# Patient Record
Sex: FEMALE | Race: BLACK OR AFRICAN AMERICAN
[De-identification: names, ages, dates, MRNs, and addresses within clinical notes are randomized per-mention and may not be internally consistent; named-entity substitution may affect disease eponyms.]

---

## 2017-03-23 NOTE — EMERGENCY ROOM REPORT
History of Present Illness


General


Chief Complaint:  Nausea


Source:  Patient





Present Illness


HPI


26-year-old female presents ED.  States for one week she's been feeling dizzy 

and lightheaded with nausea and vomiting.  Denies any headache.  Denies any 

fevers or chills.  Denies any dysuria or hematuria.  Patient notes history of 

vertigo but denies any room spinning sensation at this time.  No aggravating 

relieving factors.  Denies any other associated symptoms


Allergies:  


Coded Allergies:  


     No Known Allergies (Unverified , 12/28/16)





Patient History


Past Medical History:  asthma


Past Surgical History:  none


Pertinent Family History:  none


Social History:  Denies: alcohol use, drug use, smoking


Pregnant Now:  No


Immunizations:  UTD


Reviewed Nursing Documentation:  PMH: Agreed, PSxH: Agreed





Nursing Documentation-PMH


Hx Asthma:  Yes





Review of Systems


All Other Systems:  negative except mentioned in HPI





Physical Exam





Vital Signs








  Date Time  Temp Pulse Resp B/P Pulse Ox O2 Delivery O2 Flow Rate FiO2


 


3/23/17 12:08 97.7 65 20 112/72 98 Room Air  








Sp02 EP Interpretation:  reviewed, normal


General Appearance:  no apparent distress, alert, GCS 15, non-toxic


Head:  normocephalic, atraumatic


Eyes:  bilateral eye PERRL, bilateral eye normal inspection


ENT:  hearing grossly normal, normal pharynx, no angioedema, normal voice


Neck:  full range of motion, supple/symm/no masses


Respiratory:  chest non-tender, lungs clear, normal breath sounds, speaking 

full sentences


Cardiovascular #1:  regular rate, rhythm, no edema


Cardiovascular #2:  2+ carotid (R), 2+ carotid (L), 2+ radial (R), 2+ radial (L)

, 2+ dorsalis pedis (R), 2+ dorsalis pedis (L)


Gastrointestinal:  normal bowel sounds, non tender, soft, non-distended, no 

guarding, no rebound


Rectal:  deferred


Genitourinary:  normal inspection, no CVA tenderness


Musculoskeletal:  back normal, gait/station normal, normal range of motion, non-

tender


Neurologic:  alert, oriented x3, responsive, motor strength/tone normal, 

sensory intact, speech normal


Psychiatric:  judgement/insight normal, memory normal, mood/affect normal, no 

suicidal/homicidal ideation


Reflexes:  3+ bicep (R), 3+ bicep (L), 3+ tricep (R), 3+ tricep (L), 3+ knee (R)

, 3+ knee (L)


Skin:  normal color, no rash, warm/dry, well hydrated


Lymphatic:  no adenopathy





Medical Decision Making


Diagnostic Impression:  


 Primary Impression:  


 Dizziness


ER Course


Hospital Course 


26-year-old female presents ED complaining of dizziness and nausea times one 

week





Differential diagnoses include: tension headache, migraine, dehydration, UTI





Clinical course


Patient placed on stretcher.  After initial history and physical I ordered labs

, IV fluids, Reglan, tylenol





Before workup could be completed, patient pulled out IV and left.  Stating that 

she needed to take her daughter home was with her at bedside.





Labs-no leukocytosis, hemoglobin/hematocrit stable, electrolytes okay, UA 

negative





Diagnosis- dizziness





patient eloped from ER





Labs








Test


  3/23/17


11:10 3/23/17


11:15


 


Urine Color Pale yellow  


 


Urine Appearance Clear  


 


Urine pH 7 (4.5-8.0)  


 


Urine Specific Gravity


  1.010


(1.005-1.035) 


 


 


Urine Protein


  Negative


(NEGATIVE) 


 


 


Urine Glucose (UA)


  Negative


(NEGATIVE) 


 


 


Urine Ketones


  Negative


(NEGATIVE) 


 


 


Urine Occult Blood 4+ (NEGATIVE)  


 


Urine Nitrite


  Negative


(NEGATIVE) 


 


 


Urine Bilirubin


  Negative


(NEGATIVE) 


 


 


Urine Urobilinogen


  Normal MG/DL


(0.0-1.0) 


 


 


Urine Leukocyte Esterase 1+ (NEGATIVE)  


 


Urine RBC


  5-10 /HPF (0 -


2) 


 


 


Urine WBC


  2-4 /HPF (0 -


2) 


 


 


Urine Squamous Epithelial


Cells Few /LPF


(NONE/OCC) 


 


 


Urine Bacteria


  Few /HPF


(NONE) 


 


 


Urine HCG, Qualitative Negative  


 


White Blood Count


  


  6.1 K/UL


(4.8-10.8)


 


Red Blood Count


  


  5.01 M/UL


(4.20-5.40)


 


Hemoglobin


  


  14.6 G/DL


(12.0-16.0)


 


Hematocrit


  


  43.8 %


(37.0-47.0)


 


Mean Corpuscular Volume  88 FL (80-99) 


 


Mean Corpuscular Hemoglobin


  


  29.2 PG


(27.0-31.0)


 


Mean Corpuscular Hemoglobin


Concent 


  33.3 G/DL


(32.0-36.0)


 


Red Cell Distribution Width


  


  11.8 %


(11.6-14.8)


 


Platelet Count


  


  196 K/UL


(150-450)


 


Mean Platelet Volume


  


  12.3 FL


(6.5-10.1)


 


Neutrophils (%) (Auto)


  


  55.0 %


(45.0-75.0)


 


Lymphocytes (%) (Auto)


  


  33.9 %


(20.0-45.0)


 


Monocytes (%) (Auto)


  


  9.1 %


(1.0-10.0)


 


Eosinophils (%) (Auto)


  


  0.9 %


(0.0-3.0)


 


Basophils (%) (Auto)


  


  1.1 %


(0.0-2.0)


 


Sodium Level


  


  139 mEQ/L


(135-145)


 


Potassium Level


  


  3.8 mEQ/L


(3.4-4.9)


 


Chloride Level


  


  98 mEQ/L


()


 


Carbon Dioxide Level


  


  27 mEQ/L


(20-30)


 


Anion Gap  14 (5-15) 


 


Blood Urea Nitrogen


  


  11 mg/dL


(7-23)


 


Creatinine


  


  0.9 mg/dL


(0.5-0.9)


 


Estimat Glomerular Filtration


Rate 


  > 60 mL/min


(>60)


 


Glucose Level


  


  93 mg/dL


()


 


Calcium Level


  


  9.8 mg/dL


(8.6-10.2)


 


Total Bilirubin


  


  0.2 mg/dL


(0.0-1.2)


 


Aspartate Amino Transf


(AST/SGOT) 


  18 U/L (5-40) 


 


 


Alanine Aminotransferase


(ALT/SGPT) 


  15 U/L (3-33) 


 


 


Alkaline Phosphatase


  


  85 U/L


()


 


Total Protein


  


  7.5 g/dL


(6.6-8.7)


 


Albumin


  


  4.1 g/dL


(3.5-5.2)


 


Globulin  3.4 g/dL 


 


Albumin/Globulin Ratio  1.2 (1.0-2.7) 











Last Vital Signs








  Date Time  Temp Pulse Resp B/P Pulse Ox O2 Delivery O2 Flow Rate FiO2


 


3/23/17 12:57 97.7  20 112/72 98 Room Air  


 


3/23/17 12:08  65      








Status:  unchanged


Disposition:  ELOPED


Condition:  Unknown


Referrals:  


[facility] (PCP)











STEPHEN ROMAN M.D. Mar 23, 2017 13:52

## 2017-04-03 NOTE — EMERGENCY ROOM REPORT
History of Present Illness


General


Chief Complaint:  Female Urogenital Problems


Source:  Patient





Present Illness


[facility]


The patient is a 26-year-old female presenting for possible urinary tract 

infection.  The patient denies any vaginal pain or dysuria but does admit to 

increased urinary frequency.  The patient states that she was supposed to start 

her period 2 days prior but has not.  The patient denies any other symptoms 

including nausea, vomiting, abdominal pain, flank pain, fever, chills, vaginal 

DC


Allergies:  


Coded Allergies:  


     No Known Allergies (Unverified , 12/28/16)





Patient History


Past Medical History:  see triage record


Pertinent Family History:  none


Reviewed Nursing Documentation:  PMH: Agreed, PSxH: Agreed





Nursing Documentation-PMH


Past Medical History:  No Stated History


Hx Asthma:  Yes





Review of Systems


All Other Systems:  negative except mentioned in HPI





Physical Exam





Vital Signs








  Date Time  Temp Pulse Resp B/P Pulse Ox O2 Delivery O2 Flow Rate FiO2


 


4/3/17 18:29 98.1 80 16 110/58 99 Room Air  








Sp02 EP Interpretation:  reviewed, normal


General Appearance:  no apparent distress, alert, GCS 15, non-toxic


Head:  normocephalic, atraumatic


Eyes:  bilateral eye PERRL, bilateral eye normal inspection


ENT:  hearing grossly normal, normal pharynx, no angioedema, normal voice


Neck:  full range of motion, supple/symm/no masses


Gastrointestinal:  normal bowel sounds, non tender, soft, non-distended, no 

guarding, no rebound


Genitourinary:  normal inspection, no CVA tenderness


Musculoskeletal:  back normal, gait/station normal, normal range of motion, non-

tender


Neurologic:  alert, oriented x3, responsive, motor strength/tone normal, 

sensory intact, speech normal


Psychiatric:  judgement/insight normal, memory normal, mood/affect normal, no 

suicidal/homicidal ideation


Skin:  normal color, no rash, warm/dry, well hydrated


Lymphatic:  no adenopathy





Medical Decision Making


PA Attestation


Dr. Cotton is my supervising physician. Patient management was discussed with 

my supervising physician


Diagnostic Impression:  


 Primary Impression:  


 Pregnancy


ER Course


The patient is a 26-year-old female presenting for possible urinary tract 

infection.





Differential diagnosis considered but not limited to: UTI, vaginitis, 

pyelonephritis, PID, pregnancy 





PE: Vitals WNL.


NAD. 


Abdomen: Normal appearance. Non distended. No ecchymosis. 


Normal BS. Non TTP. No McBurney point tenderness. No guarding. 


No CVA tenderness





Urinalysis is unremarkable.


Positive pregnancy





The patient is informed of this result and needs to followup with OB/GYN.


Patient is given a prescription for prenatal vitamins.  ER precautions given





Laboratory Tests








Test


  4/3/17


18:40


 


Urine Color Pale yellow  


 


Urine Appearance Clear  


 


Urine pH 6 (4.5-8.0)  


 


Urine Specific Gravity


  1.020


(1.005-1.035)


 


Urine Protein


  Negative


(NEGATIVE)


 


Urine Glucose (UA)


  Negative


(NEGATIVE)


 


Urine Ketones


  Negative


(NEGATIVE)


 


Urine Occult Blood


  3+ (NEGATIVE)


H


 


Urine Nitrite


  Negative


(NEGATIVE)


 


Urine Bilirubin


  Negative


(NEGATIVE)


 


Urine Urobilinogen


  Normal MG/DL


(0.0-1.0)


 


Urine Leukocyte Esterase


  1+ (NEGATIVE)


H


 


Urine RBC


  2-4 /HPF (0 -


2)  H


 


Urine WBC


  0-2 /HPF (0 -


2)


 


Urine Squamous Epithelial


Cells Few /LPF


(NONE/OCC)


 


Urine Bacteria


  Few /HPF


(NONE)


 


Urine HCG, Qualitative Positive  








Lab Results Impression


Urinalysis is unremarkable.


Positive pregnancy





Last Vital Signs








  Date Time  Temp Pulse Resp B/P Pulse Ox O2 Delivery O2 Flow Rate FiO2


 


4/3/17 19:43 98.1 81 16 110/58 99 Room Air  








Status:  improved


Disposition:  HOME, SELF-CARE


Condition:  Improved


Scripts


Pnv Cmb#21/Iron/Folic Acid (PRENATAL COMPLETE CAPLET) 1 Each Tablet


1 EACH PO DAILY, #30 TAB


   Prov: LIVE JACQUES         4/3/17


Referrals:  


[facility] (PCP)


Patient Instructions:  First Trimester of Pregnancy





Additional Instructions:  


I discussed my findings with the patient. All questions and concerns have been 

answered. Treatment and medication compliance have been addressed. I advised 

the patient that they need to follow up with PMD in 3-5 days. Return to ED if 

symptoms worsen, new symptoms arise, or if needed for any reason. Patient 

verbalized understanding of discharge instructions.





The patient is advised she needs to followup with OB/GYN











LIVE JACQUES Apr 3, 2017 22:58

## 2017-04-06 NOTE — EMERGENCY ROOM REPORT
History of Present Illness


General


Chief Complaint:  Abdominal Pain


Source:  Patient





Present Illness


HPI


26-year-old female presents to the emergency department complaining of 

abdominal pain with continued spotting since .  Patient states that she 

was told she was pregnant by urine pregnancy test here in the emergency 

department last week.  Patient has not been able to followup with OB/GYN.  

Patient states over the course of today she has had progressive abdominal pain 

with nausea.   Patient reports right sided abdominal tenderness. denies fall or 

trauma.  Patient reports lower abdominal cramping.  Patient denies fevers or 

chills. Denies CP, Palpitations, LOC, AMS, dizziness, Changes in Vision, 

Sensation, paresthesias, or a sudden severe headache.


Allergies:  


Coded Allergies:  


     No Known Allergies (Unverified , 17)





Patient History


Past Medical History:  see triage record


Past Surgical History:  none


Pertinent Family History:  none


Last Menstrual Period:  n/a


Pregnant Now:  Yes


:  3


Para:  1


Reviewed Nursing Documentation:  PMH: Agreed, PSxH: Agreed





Nursing Documentation-PMH


Past Medical History:  No History, Except For


Hx Asthma:  Yes





Review of Systems


All Other Systems:  negative except mentioned in HPI





Physical Exam





Vital Signs








  Date Time  Temp Pulse Resp B/P Pulse Ox O2 Delivery O2 Flow Rate FiO2


 


17 15:23 98.1 64 16 119/82 99 Room Air  








Sp02 EP Interpretation:  reviewed, normal


General Appearance:  no apparent distress, alert, GCS 15, non-toxic


Head:  normocephalic, atraumatic


Eyes:  bilateral eye PERRL, bilateral eye normal inspection


ENT:  hearing grossly normal, normal pharynx, no angioedema, normal voice


Neck:  full range of motion, supple/symm/no masses


Respiratory:  chest non-tender, lungs clear, normal breath sounds, speaking 

full sentences


Cardiovascular #1:  regular rate, rhythm, no edema


Gastrointestinal:  normal bowel sounds, non tender, soft, no guarding, no 

rebound, other - PT Tenderness is Right adenexal no appreciable abdominal TTP


Rectal:  deferred


Genitourinary:  normal inspection, no CVA tenderness, other - right adenexal TTP


Musculoskeletal:  back normal, gait/station normal, normal range of motion, non-

tender


Neurologic:  alert, oriented x3, responsive, motor strength/tone normal, 

sensory intact, speech normal


Psychiatric:  judgement/insight normal, memory normal, mood/affect normal, no 

suicidal/homicidal ideation


Skin:  normal color, no rash, warm/dry, well hydrated


Lymphatic:  no adenopathy





Medical Decision Making


PA Attestation


Dr. guevara is my supervising Physician whom patient management has been 

discussed with.


Diagnostic Impression:  


 Primary Impression:  


 Ectopic pregnancy


 Qualified Codes:  O00.20 - Ovarian pregnancy without intrauterine pregnancy


 Additional Impression:  


 UTI


ER Course


26-year-old female presents to the emergency department complaining of 

abdominal pain with continued spotting since .  Patient states that she 

was told she was pregnant by urine pregnancy test here in the emergency 

department last week.  Patient has not been able to followup with OB/GYN.  

Patient states over the course of today she has had progressive abdominal pain 

with nausea.  Patient reports right sided abdominal tenderness. denies fall or 

trauma.  Patient reports lower abdominal cramping.  Patient denies fevers or 

chills.





Ddx considered but are not limited to: Fibroid, ectopic pregnancy, Fibroid, 

Spontaneous . 


Vital signs: are WNL, pt. is afebrile 





H&PE are most consistent with: possible ectopic pregnancy 


ORDERS: 


-Urine hcg- Positive 


-serum Hcg Quant:  2,069 


- Blood/RH type and screen- see attached labs : (B Negative)


-Pelvic US complete- Right Adnexal extrauterine structure with yolk sack --

indicating ectopic pregnancy of the right ovary, no IUP per official radiology 

report. 


 


ED INTERVENTIONS: 


-RhoGam 1500 IU IM once.





**  OBGYN CONSULT: Dr. Mesa who recommended IM Methotrexate and follow up with 

OBGYN for repeat hcg in 3-5 days. 





- Methotrexate IM 100mg





DISCHARGE: At this time pt. is stable for d/c to home. Will provide printed 

patient care instructions, and any necessary prescriptions. Care plan and 

follow up instructions have been discussed with the patient prior to discharge.





Labs








Test


  17


15:28 17


15:55 17


16:41 17


18:40


 


Urine HCG, Qualitative Positive    


 


Urine Color  Pale yellow   


 


Urine Appearance  Clear   


 


Urine pH  6.5 (4.5-8.0)   


 


Urine Specific Gravity


  


  1.015


(1.005-1.035) 


  


 


 


Urine Protein


  


  Negative


(NEGATIVE) 


  


 


 


Urine Glucose (UA)


  


  Negative


(NEGATIVE) 


  


 


 


Urine Ketones


  


  Negative


(NEGATIVE) 


  


 


 


Urine Occult Blood  4+ (NEGATIVE)   


 


Urine Nitrite


  


  Negative


(NEGATIVE) 


  


 


 


Urine Bilirubin


  


  Negative


(NEGATIVE) 


  


 


 


Urine Urobilinogen


  


  Normal MG/DL


(0.0-1.0) 


  


 


 


Urine Leukocyte Esterase  3+ (NEGATIVE)   


 


Urine RBC


  


  10-15 /HPF (0


- 2) 


  


 


 


Urine WBC


  


  5-10 /HPF (0 -


2) 


  


 


 


Urine Squamous Epithelial


Cells 


  Moderate /LPF


(NONE/OCC) 


  


 


 


Urine Amorphous Sediment


  


  Few /LPF


(NONE) 


  


 


 


Urine Bacteria


  


  Moderate /HPF


(NONE) 


  


 


 


Sodium Level


  


  138 mEQ/L


(135-145) 


  


 


 


Potassium Level


  


  3.7 mEQ/L


(3.4-4.9) 


  


 


 


Chloride Level


  


  98 mEQ/L


() 


  


 


 


Carbon Dioxide Level


  


  23 mEQ/L


(20-30) 


  


 


 


Anion Gap  17 (5-15)   


 


Blood Urea Nitrogen  6 mg/dL (7-23)   


 


Creatinine


  


  0.8 mg/dL


(0.5-0.9) 


  


 


 


Estimat Glomerular Filtration


Rate 


  > 60 mL/min


(>60) 


  


 


 


Glucose Level


  


  110 mg/dL


() 


  


 


 


Calcium Level


  


  9.2 mg/dL


(8.6-10.2) 


  


 


 


Human Chorionic Gonadotropin,


Quant 


  2063 mIU/mL 


  


  


 


 


White Blood Count


  


  


  5.1 K/UL


(4.8-10.8) 


 


 


Red Blood Count


  


  


  4.51 M/UL


(4.20-5.40) 


 


 


Hemoglobin


  


  


  14.0 G/DL


(12.0-16.0) 


 


 


Hematocrit


  


  


  39.5 %


(37.0-47.0) 


 


 


Mean Corpuscular Volume   88 FL (80-99)  


 


Mean Corpuscular Hemoglobin


  


  


  31.0 PG


(27.0-31.0) 


 


 


Mean Corpuscular Hemoglobin


Concent 


  


  35.4 G/DL


(32.0-36.0) 


 


 


Red Cell Distribution Width


  


  


  11.7 %


(11.6-14.8) 


 


 


Platelet Count


  


  


  175 K/UL


(150-450) 


 


 


Mean Platelet Volume


  


  


  11.1 FL


(6.5-10.1) 


 


 


Neutrophils (%) (Auto)


  


  


  54.7 %


(45.0-75.0) 


 


 


Lymphocytes (%) (Auto)


  


  


  37.8 %


(20.0-45.0) 


 


 


Monocytes (%) (Auto)


  


  


  6.4 %


(1.0-10.0) 


 


 


Eosinophils (%) (Auto)


  


  


  0.4 %


(0.0-3.0) 


 


 


Basophils (%) (Auto)


  


  


  0.8 %


(0.0-2.0) 


 


 


Prothrombin Time


  


  


  


  10.4 SEC


(9.30-11.50)


 


Prothromb Time International


Ratio 


  


  


  1.0 (0.9-1.1) 


 


 


Activated Partial


Thromboplast Time 


  


  


  28 SEC (23-33) 


 











Last Vital Signs








  Date Time  Temp Pulse Resp B/P Pulse Ox O2 Delivery O2 Flow Rate FiO2


 


17 15:23 98.1 64 16 119/82 99 Room Air  








Disposition:  HOME, SELF-CARE


Condition:  Stable


Scripts


Nitrofurantoin Monohyd/M-Cryst* (MACROBID 100 MG*) 100 Mg Capsule


100 MG ORAL EVERY 12 HOURS for 5 Days, #10 CAP


   Prov: Krystal Hair         17 


Hydrocodone Bit/Acetaminophen 5-325* (NORCO 5-325 TABLET*) 1 Each Tablet


1 TAB ORAL Q6HR Y for For Pain, #7 TAB


   Prov: Krystal Hair         17 


Ibuprofen* (MOTRIN*) 600 Mg Tablet


600 MG ORAL THREE TIMES A DAY, #30 TAB 0 Refills


   Prov: Krystal Hair         17


Patient Instructions:  Ectopic Pregnancy, Easy-to-Read





Additional Instructions:  


Take medications as directed. 





Follow up with OBGYN in 3-5 days for repeat HCG, your  Hcg quant today was 2,

069. 


 


Return sooner to ED if new symptoms occur, or current symptoms become worse. 


Do not drink alcohol, drive, or operate heavy machinery while taking Norco as 

this may cause drowsiness. 











- Please note that this Emergency Department Report was dictated using [employer] technology software, occasionally this can lead to 

erroneous entry secondary to interpretation by the dictation equipment.











rKystal Hair 2017 16:14

## 2017-04-07 NOTE — DIAGNOSTIC IMAGING REPORT
Indication: Pelvic pain, positive pregnancy test



Technique: Transabdominal and transvaginal images.



Comparison: 9/29/2014



Findings: Uterus is retroverted, measures 10 cm length by 6.2 cm AP. Endometrium 

is

thickened, measuring 24 mm in thickness. No intrauterine gestational sac is

demonstrated. No myometrial abnormality.



Adjacent to the right ovary is a cyst like structure with an echogenic rim and

possible central yolk sac. The right ovary itself measures 4.2 cm in length. The

left ovary measures 3.6 cm in length. There is trace free pelvic fluid, possibly

with some debris



Impression: Ectopic pregnancy. No intrauterine pregnancy demonstrated. Cystic

structure in right adnexal region with possible surrounding decidual reaction and

yolk sac Findings are suspicious for right adnexal ectopic pregnancy



Small amount free pelvic fluid, possibly physiologic or possibly some hemorrhage

related to the above



Markedly thickened endometrium



Critical value findings discussed by phone by Dr. Block with Dr. Renee at 1831 

on

4/6/2017

## 2017-04-09 NOTE — EMERGENCY ROOM REPORT
History of Present Illness


General


Chief Complaint:  Pregnancy Complications


Source:  Patient





Present Illness


HPI


26-year-old female presents to ED for evaluation.  Patient was here 2 days ago 

for ectopic pregnancy.  Patient was given methotrexate injection and 

subsequently discharged.  Patient was told to return to OB/GYN in 3-4 days to 

have repeat beta-hCG levels drawn.  Patient is here today because she is also 

having some cramping pain.  Noted spotting.  Pain is 5/10.  Nonradiating.  No 

other aggravating or relieving factors.  Patient states the pain is less than 

her previous visit.  Patient states that she was prescribed Norco and states it 

is too strong.  Patient would like a different prescription for pain.  No other 

aggravating or relieving factors.  Denies any other associated symptoms


Allergies:  


Coded Allergies:  


     No Known Allergies (Unverified , 17)





Patient History


Past Medical History:  asthma


Past Surgical History:  none


Pertinent Family History:  none


Social History:  Denies: alcohol use, drug use, smoking


Last Menstrual Period:  17


Pregnant Now:  No


:  3


Para:  1


Immunizations:  UTD


Reviewed Nursing Documentation:  PMH: Agreed, PSxH: Agreed





Nursing Documentation-PMH


Past Medical History:  No Stated History


Hx Asthma:  Yes





Review of Systems


All Other Systems:  negative except mentioned in HPI





Physical Exam





Vital Signs








  Date Time  Temp Pulse Resp B/P Pulse Ox O2 Delivery O2 Flow Rate FiO2


 


17 13:12 97.9 67 16 112/68 100 Room Air  








Sp02 EP Interpretation:  reviewed, normal


General Appearance:  no apparent distress, alert, GCS 15, non-toxic


Head:  normocephalic, atraumatic


Eyes:  bilateral eye PERRL, bilateral eye normal inspection


ENT:  hearing grossly normal, normal pharynx, no angioedema, normal voice


Neck:  full range of motion, supple/symm/no masses


Respiratory:  chest non-tender, lungs clear, normal breath sounds, speaking 

full sentences


Cardiovascular #1:  regular rate, rhythm, no edema


Cardiovascular #2:  2+ carotid (R), 2+ carotid (L), 2+ radial (R), 2+ radial (L)

, 2+ dorsalis pedis (R), 2+ dorsalis pedis (L)


Gastrointestinal:  normal bowel sounds, non tender, soft, non-distended, no 

guarding, no rebound


Rectal:  deferred


Genitourinary:  normal inspection, no CVA tenderness


Musculoskeletal:  back normal, gait/station normal, normal range of motion, non-

tender


Neurologic:  alert, oriented x3, responsive, motor strength/tone normal, 

sensory intact, speech normal


Psychiatric:  judgement/insight normal, memory normal, mood/affect normal, no 

suicidal/homicidal ideation


Reflexes:  3+ bicep (R), 3+ bicep (L), 3+ tricep (R), 3+ tricep (L), 3+ knee (R)

, 3+ knee (L)


Skin:  normal color, no rash, warm/dry, well hydrated


Lymphatic:  no adenopathy





Medical Decision Making


Diagnostic Impression:  


 Primary Impression:  


 Ectopic pregnancy


 Qualified Codes:  O00.90 - Unspecified ectopic pregnancy without intrauterine 

pregnancy


ER Course


26-year-old female presents to ED with cramping pain and spotting after 

receiving methotrexate injection.  Status post ectopic pregnancy





Differential-ongoing miscarriage, adverse reaction to medication, ectopic 

pregnancy





Patient placed on stretcher.  After initial history physical exam reveals a 

young female in no acute distress.  Abdominal exam unremarkable.





I was supervising physician when patient was here 2 days ago.  Discussed case 

with Dr. Mesa to give methotrexate.  I explained to the patient that it is too 

early to repeat blood levels patient should followup with OB/GYN.  Patient 

agrees.





Patient is requesting a different pain medication at the [facility] is too strong.  

We will prescribe Tylenol #3 instead





Given Tylenol #3 in ED with pain improved





Diagnosis-ectopic pregnancy





Stable and discharged to home with prescription for Tylenol #3.  Followup with 

OB/GYN for repeat beta hCG levels.  Return to ED symptoms recur or worsen





Last Vital Signs








  Date Time  Temp Pulse Resp B/P Pulse Ox O2 Delivery O2 Flow Rate FiO2


 


17 13:47 97.9  16 112/68 100 Room Air  


 


17 13:12  67      








Status:  improved


Disposition:  HOME, SELF-CARE


Condition:  Stable


Scripts


Acetaminophen With Codeine (T#3) (TYLENOL #3 TAB*) Y Tab


1 TAB ORAL Q8H Y for For Pain, #20 TAB


   Prov: STEPHEN ROMAN M.D.         17


Referrals:  


NON PHYSICIAN (PCP)


Patient Instructions:  Methotrexate injection











STEPHEN ROMAN M.D. 2017 07:04

## 2017-05-04 NOTE — EMERGENCY ROOM REPORT
History of Present Illness


General


Chief Complaint:  Female Urogenital Problems


Source:  Patient





Present Illness


HPI


27 YO Female presents to the ED c/o vaginal irritation/itching s/p returning 

from [place] and also finished a recent course of abx for UTI.pt also has 

several insect bites that are itchy on UE and LE. no fevers, chills, malaise. 

She denies abdominal pain, lesions elsewhere, vaginal discharge, hematuria.  

Patient reports mild dysuria.  Denies low back pain. 


Denies CP, Palpitations, LOC, AMS, dizziness, Changes in Vision, Sensation, 

paresthesias, or a sudden severe headache.


Allergies:  


Coded Allergies:  


     No Known Allergies (Unverified , 4/6/17)





Patient History


Past Medical History:  see triage record


Past Surgical History:  none


Pertinent Family History:  none


Last Menstrual Period:  4/20/17


Pregnant Now:  No


Reviewed Nursing Documentation:  PMH: Agreed, PSxH: Agreed





Nursing Documentation-PMH


Past Medical History:  No Stated History


Hx Asthma:  Yes





Review of Systems


All Other Systems:  negative except mentioned in HPI





Physical Exam





Vital Signs








  Date Time  Temp Pulse Resp B/P Pulse Ox O2 Delivery O2 Flow Rate FiO2


 


5/4/17 12:20 97.9 69 18 115/64 100 Room Air  








Sp02 EP Interpretation:  reviewed, normal


General Appearance:  no apparent distress, alert, GCS 15, non-toxic


Head:  normocephalic, atraumatic


Eyes:  bilateral eye PERRL, bilateral eye normal inspection


ENT:  hearing grossly normal, normal pharynx, no angioedema, normal voice


Neck:  full range of motion, supple/symm/no masses


Respiratory:  chest non-tender, lungs clear, normal breath sounds, speaking 

full sentences


Cardiovascular #1:  regular rate, rhythm, no edema


Gastrointestinal:  normal bowel sounds, non tender, soft, no guarding, no 

rebound


Rectal:  deferred


Genitourinary:  normal inspection, no CVA tenderness


Musculoskeletal:  back normal, gait/station normal, normal range of motion, non-

tender


Neurologic:  alert, oriented x3, responsive, motor strength/tone normal, 

sensory intact, speech normal


Psychiatric:  judgement/insight normal, memory normal, mood/affect normal


Skin:  normal color, no rash, warm/dry, well hydrated


Lymphatic:  no adenopathy





Medical Decision Making


PA Attestation


Dr. Gaytan  is my supervising Physician whom patient management has been 

discussed with.


Diagnostic Impression:  


 Primary Impression:  


 Vaginitis


 Qualified Codes:  N76.0 - Acute vaginitis


 Additional Impression:  


 Insect bite


 Qualified Codes:  W57.XXXA - Bitten or stung by nonvenomous insect and other 

nonvenomous arthropods, initial encounter


ER Course


Pt. presents to the ED c/o vaginal irritation/itching s/p returning from 

[place] and also finished a recent course of abx for UTI.pt also has several 

insect bites that are itchy on UE and LE. no fevers, chills, malaise. 





Ddx considered but are not limited to UTi , Pyelo, STI, Stone, Cystitis


Vital signs: are WNL, pt. is afebrile 


H&PE are most consistent with yeast vaginitis


ORDERS:


- UA labs are attached-- no acute infection evidence of few bacteria from 

contamination.


ED INTERVENTIONS: 


-Diflucan 150mg PO


 


DISCHARGE: At this time pt. is stable for d/c to home. Will provide printed 

patient care instructions, and any necessary prescriptions. Care plan and 

follow up instructions have been discussed with the patient prior to discharge.





Labs








Test


  5/4/17


12:30


 


Urine Color Pale yellow 


 


Urine Appearance Clear 


 


Urine pH 7 (4.5-8.0) 


 


Urine Specific Gravity


  1.015


(1.005-1.035)


 


Urine Protein


  Negative


(NEGATIVE)


 


Urine Glucose (UA)


  Negative


(NEGATIVE)


 


Urine Ketones


  Negative


(NEGATIVE)


 


Urine Occult Blood


  Negative


(NEGATIVE)


 


Urine Nitrite


  Negative


(NEGATIVE)


 


Urine Bilirubin


  Negative


(NEGATIVE)


 


Urine Urobilinogen


  Normal MG/DL


(0.0-1.0)


 


Urine Leukocyte Esterase 2+ (NEGATIVE) 


 


Urine RBC


  0-2 /HPF (0 -


2)


 


Urine WBC


  2-4 /HPF (0 -


2)


 


Urine Squamous Epithelial


Cells Many /LPF


(NONE/OCC)


 


Urine Bacteria


  Few /HPF


(NONE)


 


Urine HCG, Qualitative Negative 











Last Vital Signs








  Date Time  Temp Pulse Resp B/P Pulse Ox O2 Delivery O2 Flow Rate FiO2


 


5/4/17 12:20 97.9 69 18 115/64 100 Room Air  








Disposition:  HOME, SELF-CARE


Condition:  Stable


Scripts


Hydrocortisone 2% Cream (ANTI-ITCH 2% CREAM) Y Cr


1 APPLIC TP BID, #28 GM


   Prov: Krystal Hair         5/4/17 


Fluconazole (FLUCONAZOLE) 100 Mg Tablet


100 MG ORAL DAILY for 3 Days, #3 TAB 0 Refills


   Prov: Krystal Hair         5/4/17


Patient Instructions:  Vaginitis





Additional Instructions:  


Take medications as directed. 


Follow up with PCP in 3-5 days 


Return sooner to ED if new symptoms occur, or current symptoms become worse. 











- Please note that this Emergency Department Report was dictated using [employer] technology software, occasionally this can lead to 

erroneous entry secondary to interpretation by the dictation equipment.











Krystal Hair May 4, 2017 13:11
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
## 2017-10-07 NOTE — EMERGENCY ROOM REPORT
History of Present Illness


General


Chief Complaint:  Pain


Source:  Patient





Present Illness


HPI


27-year-old female,  (1 miscarriage, one tubal pregnancy), presenting with 

left-sided flank pain for one day.  Pain is intermittent, sharp, 5/10 severity.

  Denies any fever chills nausea vomiting diarrhea or constipation, no dysuria 

or hematuria, last menstrual period was September 3, patient states that she is 

4 days late


Denies any history of ovarian cysts


Denies history of renal stones


Allergies:  


Coded Allergies:  


     No Known Allergies (Unverified , 17)





Patient History


Past Medical History:  see triage record


Past Surgical History:  none


Pertinent Family History:  none


Last Menstrual Period:  


Reviewed Nursing Documentation:  PMH: Agreed, PSxH: Agreed





Nursing Documentation-PMH


Hx Asthma:  Yes





Review of Systems


All Other Systems:  negative except mentioned in HPI





Physical Exam





Vital Signs








  Date Time  Temp Pulse Resp B/P (MAP) Pulse Ox O2 Delivery O2 Flow Rate FiO2


 


10/6/17 23:56 97.9 64 18 107/70 100 Room Air  








Sp02 EP Interpretation:  reviewed, normal


General Appearance:  normal inspection, well appearing, no apparent distress, 

alert, GCS 15, non-toxic, other - Calm, comfortable, nontoxic, not in pain


Head:  normocephalic, atraumatic


Eyes:  bilateral eye normal inspection, bilateral eye PERRL, bilateral eye EOMI


ENT:  normal ENT inspection, normal pharynx, normal voice, moist mucus membranes


Neck:  normal inspection, full range of motion, supple


Respiratory:  normal inspection, lungs clear, normal breath sounds, no 

respiratory distress, no retraction, no wheezing, speaking full sentences, 

chest symmetrical


Cardiovascular #1:  normal inspection, regular rate, rhythm, no edema, normal 

capillary refill


Cardiovascular #2:  2+ radial (R), 2+ radial (L)


Gastrointestinal:  normal inspection, non tender, soft, non-distended, no 

guarding, other - Nontender all quadrants of the abdomen


Genitourinary:  no CVA tenderness


Musculoskeletal:  normal inspection, back normal, normal range of motion, non-

tender


Neurologic:  normal inspection, alert, oriented x3, responsive, motor strength/

tone normal, sensory intact, normal gait, speech normal


Psychiatric:  normal inspection, judgement/insight normal, memory normal


Skin:  normal inspection, normal color, no rash, warm/dry, well hydrated, 

normal turgor





Medical Decision Making


Diagnostic Impression:  


 Primary Impression:  


 UTI (urinary tract infection)


ER Course


27-year-old female with left-sided abdominal pain





DDX:


UTI/pyelo-, pregnancy, ectopic pregnancy, diverticulitis, gastritis, 

gastroenteritis


At this time abdomen is soft nontender, patient does not appear to be in pain, 

will hold imaging for now


Plan:


Obtain labs, ua, ucx, ucg





ER course:


Patient has remained stable during ED stay.


Patient not in pain, has not required any pain medication during ED stay


UA positive


Not pregnant








Disposition:


Patient is to be discharged to home with Keflex





Patient is instructed to follow up with their primary care doctor within 5 

days. 





Strict return precautions discussed with patient such as fever, chills, 

worsening/severe pain, nausea, vomiting, which may indicate severe illness. 

Patient verbalizes understanding and agrees with plan. 





Please note that this Emergency Department Report was dictated using [employer] technology software, occasionally this can lead to 

erroneous entry secondary to interpretation by the dictation equipment





Laboratory Tests








Test


  10/7/17


00:20


 


White Blood Count


  5.4 K/UL


(4.8-10.8)


 


Red Blood Count


  4.73 M/UL


(4.20-5.40)


 


Hemoglobin


  14.0 G/DL


(12.0-16.0)


 


Hematocrit


  42.5 %


(37.0-47.0)


 


Mean Corpuscular Volume 90 FL (80-99)  


 


Mean Corpuscular Hemoglobin


  29.7 PG


(27.0-31.0)


 


Mean Corpuscular Hemoglobin


Concent 33.0 G/DL


(32.0-36.0)


 


Red Cell Distribution Width


  11.2 %


(11.6-14.8)  L


 


Platelet Count


  185 K/UL


(150-450)


 


Mean Platelet Volume


  12.5 FL


(6.5-10.1)  H


 


Neutrophils (%) (Auto)


  42.1 %


(45.0-75.0)  L


 


Lymphocytes (%) (Auto)


  48.6 %


(20.0-45.0)  H


 


Monocytes (%) (Auto)


  7.3 %


(1.0-10.0)


 


Eosinophils (%) (Auto)


  0.8 %


(0.0-3.0)


 


Basophils (%) (Auto)


  1.2 %


(0.0-2.0)


 


Urine Color Pale yellow  


 


Urine Appearance


  Slightly


cloudy


 


Urine pH 6 (4.5-8.0)  


 


Urine Specific Gravity


  1.020


(1.005-1.035)


 


Urine Protein


  Negative


(NEGATIVE)


 


Urine Glucose (UA)


  Negative


(NEGATIVE)


 


Urine Ketones


  Negative


(NEGATIVE)


 


Urine Occult Blood


  Negative


(NEGATIVE)


 


Urine Nitrite


  Negative


(NEGATIVE)


 


Urine Bilirubin


  Negative


(NEGATIVE)


 


Urine Urobilinogen


  Normal MG/DL


(0.0-1.0)


 


Urine Leukocyte Esterase


  2+ (NEGATIVE)


H


 


Urine RBC


  0-2 /HPF (0 -


2)


 


Urine WBC


  10-15 /HPF (0


- 2)  H


 


Urine Squamous Epithelial


Cells Moderate /LPF


(NONE/OCC)  H


 


Urine Bacteria


  Few /HPF


(NONE)


 


Urine HCG, Qualitative Negative  


 


Sodium Level


  140 mEQ/L


(135-145)


 


Potassium Level


  3.9 mEQ/L


(3.4-4.9)


 


Chloride Level


  101 mEQ/L


()


 


Carbon Dioxide Level


  29 mEQ/L


(20-30)


 


Anion Gap 10 (5-15)  


 


Blood Urea Nitrogen


  14 mg/dL


(7-23)


 


Creatinine


  1.0 mg/dL


(0.5-0.9)  H


 


Estimate Glomerular


Filtration Rate > 60 mL/min


(>60)


 


Glucose Level


  85 mg/dL


()


 


Calcium Level


  9.2 mg/dL


(8.6-10.2)


 


Total Bilirubin


  < 0.2 mg/dL


(0.0-1.2)


 


Aspartate Amino Transferase


(AST) 14 U/L (5-40)  


 


 


Alanine Aminotransferase (ALT) 13 U/L (3-33)  


 


Alkaline Phosphatase


  72 U/L


()


 


Total Protein


  7.0 g/dL


(6.6-8.7)


 


Albumin


  4.0 g/dL


(3.5-5.2)


 


Globulin 3.0 g/dL  


 


Albumin/Globulin Ratio 1.3 (1.0-2.7)  


 


Lipase 29 U/L (< 60)  


 


Human Chorionic Gonadotropin,


Quant < 1 mIU/mL  


 











Last Vital Signs








  Date Time  Temp Pulse Resp B/P (MAP) Pulse Ox O2 Delivery O2 Flow Rate FiO2


 


10/7/17 00:16 98.1 80 16 122/78 98 Room Air  








Disposition:  HOME, SELF-CARE


Condition:  Improved


Scripts


Cephalexin* (KEFLEX*) 500 Mg Capsule


500 MG ORAL Q6H for 7 Days, #28 CAP 0 Refills


   Prov: Retino,Jose WEINSTEIN         10/7/17











Jose Helton M.D. Oct 7, 2017 01:05

## 2018-01-19 NOTE — EMERGENCY ROOM REPORT
History of Present Illness


General


Chief Complaint:  Vaginal


Source:  Patient





Present Illness


HPI


The patient is a 27-year-old female presenting for possible vaginal infection.  

She noticed white discharge and itching for the past 3 days.  She denies any 

pain.  She denies other symptoms including dysuria, hematuria, nausea, vomiting

, fever, chills, back pain, abdominal pain


Allergies:  


Coded Allergies:  


     No Known Allergies (Unverified , 4/6/17)





Patient History


Past Medical History:  see triage record


Pertinent Family History:  none


Last Menstrual Period:  12/01/18


Reviewed Nursing Documentation:  PMH: Agreed, PSxH: Agreed





Nursing Documentation-PMH


Past Medical History:  No Stated History


Hx Asthma:  Yes





Review of Systems


All Other Systems:  negative except mentioned in HPI





Physical Exam





Vital Signs








  Date Time  Temp Pulse Resp B/P (MAP) Pulse Ox O2 Delivery O2 Flow Rate FiO2


 


1/19/18 18:17 98.1 76 14 104/65 99 Room Air  








Sp02 EP Interpretation:  reviewed, normal


General Appearance:  no apparent distress, alert, GCS 15, non-toxic


Head:  normocephalic, atraumatic


Eyes:  bilateral eye normal inspection, bilateral eye PERRL


ENT:  hearing grossly normal, normal pharynx, no angioedema, normal voice


Neck:  full range of motion, supple/symm/no masses


Gastrointestinal:  normal bowel sounds, non tender, soft, non-distended, no 

guarding, no rebound


Genitourinary:  normal inspection, no CVA tenderness


Musculoskeletal:  back normal, gait/station normal, normal range of motion, non-

tender


Neurologic:  alert, oriented x3, responsive, motor strength/tone normal, 

sensory intact, speech normal


Psychiatric:  judgement/insight normal, memory normal, mood/affect normal, no 

suicidal/homicidal ideation


Skin:  normal color, no rash, warm/dry, well hydrated





Medical Decision Making


PA Attestation


Dr. Renee is my supervising physician. Patient management was discussed 

with my supervising physician


Diagnostic Impression:  


 Primary Impression:  


 Vaginitis


 Qualified Codes:  N76.0 - Acute vaginitis


ER Course


The patient is a 27-year-old female presenting for possible vaginal infection





Differential diagnosis considered but not limited to: UTI, vaginitis, 

pyelonephritis, pyelonephrosis, PID, ectopic pregnancy 





PE: Vitals WNL.


NAD. 


Abdomen: Normal appearance. Non distended. No ecchymosis. 


Normal BS. Non TTP. No McBurney point tenderness. No guarding. 


No CVA tenderness





UA unremarkable. Neg preg





Pt will be DC'ed home with prescription for flagyl and will FU with PMD. ER 

precautions given





Laboratory Tests








Test


  1/19/18


18:28


 


Urine Color Pale yellow  


 


Urine Appearance


  Slightly


cloudy


 


Urine pH 7 (4.5-8.0)  


 


Urine Specific Gravity


  1.015


(1.005-1.035)


 


Urine Protein


  Negative


(NEGATIVE)


 


Urine Glucose (UA)


  Negative


(NEGATIVE)


 


Urine Ketones


  Negative


(NEGATIVE)


 


Urine Occult Blood


  Negative


(NEGATIVE)


 


Urine Nitrite


  Negative


(NEGATIVE)


 


Urine Bilirubin


  Negative


(NEGATIVE)


 


Urine Urobilinogen


  Normal MG/DL


(0.0-1.0)


 


Urine Leukocyte Esterase


  3+ (NEGATIVE)


H


 


Urine RBC


  0-2 /HPF (0 -


2)


 


Urine WBC


  10-15 /HPF (0


- 2)  H


 


Urine Squamous Epithelial


Cells Many /LPF


(NONE/OCC)  H


 


Urine Bacteria


  Few /HPF


(NONE)


 


Urine HCG, Qualitative Negative  








Lab Results Impression


consistent with BV.  No nitrites





Last Vital Signs








  Date Time  Temp Pulse Resp B/P (MAP) Pulse Ox O2 Delivery O2 Flow Rate FiO2


 


1/19/18 19:30 98.2 78 14 111/65 100 Room Air  








Status:  improved


Disposition:  HOME, SELF-CARE


Condition:  Improved


Scripts


Metronidazole* (FLAGYL*) 500 Mg Tablet


500 MG ORAL BID, #14 TAB 0 Refills


   Prov: LIVE JACQUES         1/19/18


Referrals:  


NON PHYSICIAN (PCP)


Patient Instructions:  Bacterial Vaginosis





Additional Instructions:  


I discussed my findings with the patient. All questions and concerns have been 

answered. Treatment and medication compliance have been addressed. I advised 

the patient that they need to follow up with PMD in 3-5 days. Return to ED if 

symptoms worsen, new symptoms arise, or if needed for any reason. Patient 

verbalized understanding of discharge instructions.











LIVE JACQUES Jan 19, 2018 22:07
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
## 2019-08-07 ENCOUNTER — HOSPITAL ENCOUNTER (EMERGENCY)
Dept: HOSPITAL 72 - EMR | Age: 29
Discharge: HOME | End: 2019-08-07
Payer: MEDICAID

## 2019-08-07 VITALS — SYSTOLIC BLOOD PRESSURE: 107 MMHG | DIASTOLIC BLOOD PRESSURE: 74 MMHG

## 2019-08-07 VITALS — DIASTOLIC BLOOD PRESSURE: 79 MMHG | SYSTOLIC BLOOD PRESSURE: 112 MMHG

## 2019-08-07 VITALS — BODY MASS INDEX: 35.82 KG/M2 | WEIGHT: 215 LBS | HEIGHT: 65 IN

## 2019-08-07 DIAGNOSIS — N93.9: Primary | ICD-10-CM

## 2019-08-07 PROCEDURE — 81025 URINE PREGNANCY TEST: CPT

## 2019-08-07 PROCEDURE — 99282 EMERGENCY DEPT VISIT SF MDM: CPT

## 2019-08-07 NOTE — NUR
ED Nurse Note:



Pt came in due to vaginal spotting x 1 day.  Also reports brown vaginal 
discharge x 1 month. Denies fever and cramping at this time. AAO x4, 
ambulatory.

## 2019-08-07 NOTE — NUR
ER DISCHARGE NOTE:



Patient is cleared to be discharged per ERMD, pt is aox4, on room air, with 
stable vital signs. pt was given dc instructions, pt was able to verbalize 
understanding, pt id band removed without complications. pt is able to ambulate 
with steady gait. pt took all belongings.

## 2019-08-07 NOTE — EMERGENCY ROOM REPORT
History of Present Illness


General


Chief Complaint:  Vaginal


Source:  Patient





Present Illness


HPI


28-year-old female history of ectopic pregnancy presents with spotting that 

started 2 days prior to arrival, no aggravating or alleviating factors symptoms 

are mild she noticed that there was spotting, she denies any chest pain 

shortness of breath, no lightheadedness, no nausea vomiting, abdominal pain.  

Patient presents for evaluation


Allergies:  


Coded Allergies:  


     No Known Allergies (Unverified , 4/6/17)





Patient History


Past Medical History:  see triage record


Last Menstrual Period:  07/27/19


Reviewed Nursing Documentation:  PMH: Agreed; PSxH: Agreed





Nursing Documentation-PM


Past Medical History:  No Stated History


Hx Asthma:  Yes





Review of Systems


All Other Systems:  negative except mentioned in HPI





Physical Exam





Vital Signs








  Date Time  Temp Pulse Resp B/P (MAP) Pulse Ox O2 Delivery O2 Flow Rate FiO2


 


8/7/19 10:26 97.5 94 18 107/74 (85) 97 Room Air  








Sp02 EP Interpretation:  reviewed, normal


General Appearance:  well appearing, no apparent distress, alert


Head:  normocephalic, atraumatic


Eyes:  bilateral eye PERRL, bilateral eye EOMI


ENT:  uvula midline, moist mucus membranes


Neck:  supple, thyroid normal, supple/symm/no masses


Respiratory:  lungs clear, no respiratory distress, no retraction, no accessory 

muscle use


Cardiovascular #1:  normal peripheral pulses, regular rate, rhythm, no edema, 

no gallop, no murmur


Gastrointestinal:  non tender, soft, no guarding, no rebound


Genitourinary:  other - Chaperone Krysten RN 1 no CMT, scant blood in the 

vaginal vault, unremarkable exam, no adnexal tenderness no discharge


Musculoskeletal:  normal inspection


Neurologic:  alert, oriented x3


Psychiatric:  mood/affect normal


Skin:  no rash, warm/dry





Medical Decision Making


Diagnostic Impression:  


 Primary Impression:  


 Vaginal bleeding


ER Course


28-year-old female presents with scant vaginal bleeding, will evaluate for 

pregnancy, low suspicion for ectopic, patient states she is 2 weeks early from 

her.  Generally bleeds every 4 weeks, her last bleeding episode was 2 weeks 

prior to arrival.  Patient most likely presents with early.  Versus ectopic 

versus pregnancy, will obtain a pregnancy test, vaginal exam unremarkable.


Patient with a negative pregnancy test, patient states that she can follow-up 

with her OB/GYN, vaginal exam unremarkable, vitals stable, follow-up with OB/

GYN in 24 to 48 hours, most likely early menstrual bleeding


disposition home with return precautions





Laboratory Tests








Test


  8/7/19


10:30


 


Urine HCG, Qualitative


  Negative


(NEGATIVE)











Last Vital Signs








  Date Time  Temp Pulse Resp B/P (MAP) Pulse Ox O2 Delivery O2 Flow Rate FiO2


 


8/7/19 10:26 97.5 94 18 107/74 97 Room Air  








Disposition:  HOME, SELF-CARE


Condition:  Stable


Referrals:  


[facility],REF[facility] (PCP)











[facility]





[facility]


Patient Instructions:  Dysfunctional Uterine Bleeding, Menstruation





Additional Instructions:  


The patient was provided with discharge instructions, notified to follow-up 

with a primary care doctor and or specialist in the next 24-48 hours, and to 

return to the ED if they have worsening of their symptoms. 





Please note that this report is being documented using DRAGON technology.


This can lead to erroneous entry secondary to incorrect interpretation by the 

dictating instrument.











Aguila Hodgson MD Aug 7, 2019 10:46